# Patient Record
(demographics unavailable — no encounter records)

---

## 2024-10-10 NOTE — PHYSICAL EXAM
[Normal] : no acute distress, well nourished, well developed and well-appearing [Normal Outer Ear/Nose] : the outer ears and nose were normal in appearance [No Respiratory Distress] : no respiratory distress  [Normal Affect] : the affect was normal [Alert and Oriented x3] : oriented to person, place, and time [Normal Insight/Judgement] : insight and judgment were intact [de-identified] : No vitals due to virtual visit

## 2024-10-10 NOTE — ASSESSMENT
[FreeTextEntry1] : # COVID Sent in paxlovid for patient, full strength based on GFR from last year Discussed side effects of medication He will hold ezetimibe-simvastatin while on this medication Recommend conservative measures - stay well-hydrated, tylenol/motrin PRN pain or fever. Trial of OTC meds - flonase/azelastine nasal spray, nasal saline spray, cough drops, cough syrup. If symptoms worsen significantly he should go to ED  # BPH Cont finasteride  f/u PRN

## 2024-10-10 NOTE — HISTORY OF PRESENT ILLNESS
[Home] : at home, [unfilled] , at the time of the visit. [Medical Office: ([facility])___] : at the medical office located in  [Verbal consent obtained from patient] : the patient, [unfilled] [FreeTextEntry8] : Pt tested positive for COVID. Started having symptoms 2-3 days ago. Complaining of cough, chest congestion, nasal drip, mild fatigue, some body aches, no fevers. Has stuffed head sensation. May have gotten it from his wife, but she tested negative. He was able to take paxlovid in past, tolerated it well.  He got his last COVID vaccine 3 weeks ago.

## 2024-12-02 NOTE — HEALTH RISK ASSESSMENT
[Excellent] : ~his/her~  mood as  excellent [Never] : Never [HIV test declined] : HIV test declined [Hepatitis C test declined] : Hepatitis C test declined [None] : None [Retired] : retired [Graduate School] : graduate school [Feels Safe at Home] : Feels safe at home [Fully functional (bathing, dressing, toileting, transferring, walking, feeding)] : Fully functional (bathing, dressing, toileting, transferring, walking, feeding) [Fully functional (using the telephone, shopping, preparing meals, housekeeping, doing laundry, using] : Fully functional and needs no help or supervision to perform IADLs (using the telephone, shopping, preparing meals, housekeeping, doing laundry, using transportation, managing medications and managing finances) [de-identified] : golf and in good shape [de-identified] : good [Patient reported colonoscopy was normal] : Patient reported colonoscopy was normal [Change in mental status noted] : No change in mental status noted [Language] : denies difficulty with language [Behavior] : denies difficulty with behavior [Learning/Retaining New Information] : denies difficulty learning/retaining new information [Handling Complex Tasks] : denies difficulty handling complex tasks [Reasoning] : denies difficulty with reasoning [Spatial Ability and Orientation] : denies difficulty with spatial ability and orientation [Sexually Active] : sexually active [Reports changes in hearing] : Reports no changes in hearing [Reports changes in vision] : Reports no changes in vision [Reports changes in dental health] : Reports no changes in dental health [ColonoscopyDate] : 2019

## 2024-12-02 NOTE — HISTORY OF PRESENT ILLNESS
[FreeTextEntry1] : here for annual [de-identified] : playing a lot of golf retired   left shoulder pain worse when moves arm left to right not worse  left groin pain